# Patient Record
Sex: FEMALE | Race: AMERICAN INDIAN OR ALASKA NATIVE
[De-identification: names, ages, dates, MRNs, and addresses within clinical notes are randomized per-mention and may not be internally consistent; named-entity substitution may affect disease eponyms.]

---

## 2019-04-03 ENCOUNTER — HOSPITAL ENCOUNTER (OUTPATIENT)
Dept: HOSPITAL 5 - US | Age: 74
Discharge: HOME | End: 2019-04-03
Attending: FAMILY MEDICINE
Payer: MEDICARE

## 2019-04-03 DIAGNOSIS — E04.2: Primary | ICD-10-CM

## 2019-04-03 PROCEDURE — 76536 US EXAM OF HEAD AND NECK: CPT

## 2019-04-04 NOTE — ULTRASOUND REPORT
Thyroid sonogram:



History: Nontoxic goiter.



Findings:



Right lobe measures 6.8 x 4.6-5.5 cm.

Left lobe measures 4.7 x 2.7 cm.

The isthmus appears normal and measures 2.3 cm.



There is complex mass, predominantly cystic, noted at the right mid 

lobe measuring 5 x 2.1 x 5 cm.

There is there is isoechoic circumscribed mass measuring 1.5 x 1.5 x 

1.7 cm at the upper border of right thyroid lobe. There is faintly 

visualized surrounding hypoechoic capsule. No abnormal color flow is 

seen.



There multiple nodule  identified at left lobe which are benign. 

Maximum diameter 1.1 cm



Impression:

Multinodular goiter. Six-month followup recommended.